# Patient Record
Sex: FEMALE | Race: WHITE | Employment: FULL TIME | URBAN - NONMETROPOLITAN AREA
[De-identification: names, ages, dates, MRNs, and addresses within clinical notes are randomized per-mention and may not be internally consistent; named-entity substitution may affect disease eponyms.]

---

## 2018-07-25 ENCOUNTER — HOSPITAL ENCOUNTER (EMERGENCY)
Age: 33
Discharge: HOME | End: 2018-07-25
Attending: EMERGENCY MEDICINE
Payer: COMMERCIAL

## 2018-07-25 VITALS
BODY MASS INDEX: 37.96 KG/M2 | SYSTOLIC BLOOD PRESSURE: 147 MMHG | HEART RATE: 88 BPM | DIASTOLIC BLOOD PRESSURE: 92 MMHG | HEIGHT: 59 IN | OXYGEN SATURATION: 100 % | WEIGHT: 188.27 LBS | RESPIRATION RATE: 18 BRPM | TEMPERATURE: 98.1 F

## 2018-07-25 DIAGNOSIS — F41.1 ANXIETY STATE: ICD-10-CM

## 2018-07-25 DIAGNOSIS — R07.89 CHEST WALL PAIN: Primary | ICD-10-CM

## 2018-07-25 LAB
EKG ATRIAL RATE: 95 BPM
EKG P AXIS: 59 DEGREES
EKG P-R INTERVAL: 138 MS
EKG Q-T INTERVAL: 360 MS
EKG QRS DURATION: 80 MS
EKG QTC CALCULATION (BAZETT): 452 MS
EKG R AXIS: 16 DEGREES
EKG T AXIS: 56 DEGREES
EKG VENTRICULAR RATE: 95 BPM

## 2018-07-25 PROCEDURE — 99284 EMERGENCY DEPT VISIT MOD MDM: CPT

## 2018-07-25 PROCEDURE — 93005 ELECTROCARDIOGRAM TRACING: CPT

## 2018-07-25 RX ORDER — BUSPIRONE HYDROCHLORIDE 10 MG/1
5 TABLET ORAL 2 TIMES DAILY
COMMUNITY

## 2018-07-25 RX ORDER — QUETIAPINE FUMARATE 25 MG/1
25 TABLET, FILM COATED ORAL NIGHTLY
COMMUNITY

## 2018-07-25 ASSESSMENT — PAIN DESCRIPTION - ORIENTATION: ORIENTATION: MID

## 2018-07-25 ASSESSMENT — PAIN DESCRIPTION - DESCRIPTORS: DESCRIPTORS: CONSTANT

## 2018-07-25 ASSESSMENT — PAIN DESCRIPTION - FREQUENCY: FREQUENCY: CONTINUOUS

## 2018-07-25 ASSESSMENT — PAIN DESCRIPTION - PAIN TYPE: TYPE: ACUTE PAIN

## 2018-07-25 ASSESSMENT — PAIN DESCRIPTION - PROGRESSION: CLINICAL_PROGRESSION: NOT CHANGED

## 2018-07-25 ASSESSMENT — PAIN DESCRIPTION - LOCATION: LOCATION: CHEST

## 2018-07-25 ASSESSMENT — PAIN SCALES - GENERAL: PAINLEVEL_OUTOF10: 6

## 2018-07-25 NOTE — ED PROVIDER NOTES
eMERGENCY dEPARTMENT eNCOUnter      CHIEF COMPLAINT    Chief Complaint   Patient presents with    Chest Pain     patient states she has some chest pain that started this am, has history of anxiety and states the walk in clinic sent her here       HPI    Anya Dodge is a 28 y.o. female who presentsto ED from Urgent care  By private car  With complaint of chest pain  Onset 2 days  Intensity of symptoms mild  She describes her chest pain as a knot in the left upper chest.  She denies any shortness of breath. 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 She was diagnosed with anxiety and was started on BuSpar one week ago. PAST  MEDICAL HISTORY    Past Medical History:   Diagnosis Date    Asthma     Depression        SURGICAL HISTORY    Past Surgical History:   Procedure Laterality Date    DILATION AND CURETTAGE OF UTERUS         CURRENT MEDICATIONS    Current Outpatient Rx   Medication Sig Dispense Refill    busPIRone (BUSPAR) 10 MG tablet Take 5 mg by mouth 2 times daily      QUEtiapine (SEROQUEL) 25 MG tablet Take 25 mg by mouth nightly         ALLERGIES    Allergies   Allergen Reactions    Codeine Nausea And Vomiting       FAMILY HISTORY    History reviewed. No pertinent family history.     SOCIAL HISTORY    Social History     Social History    Marital status:      Spouse name: N/A    Number of children: N/A    Years of education: N/A     Social History Main Topics    Smoking status: Never Smoker    Smokeless tobacco: Never Used    Alcohol use Yes      Comment: rare    Drug use: No    Sexual activity: Not Asked     Other Topics Concern    None     Social History Narrative    None       REVIEW OF SYSTEMS    Constitutional:  Denies fever, chills, weight loss or weakness   Eyes:  Denies photophobia or discharge   HENT:  Denies sore throat or ear pain   Respiratory:  Denies cough or shortness of breath   Cardiovascular:  C/O chest pain,but denies palpitations or swelling   GI: Denies abdominal pain, nausea, vomiting, or diarrhea   Musculoskeletal:  Denies back pain   Skin:  Denies rash   Neurologic:  Denies headache, focal weakness or sensory changes   Endocrine:  Denies polyuria or polydypsia   Lymphatic:  Denies swollen glands   Psychiatric:  Denies depression, suicidal ideation or homicidal ideation   All systems negative except as marked. PHYSICAL EXAM    VITAL SIGNS: BP (!) 147/92   Pulse 88   Temp 98.1 °F (36.7 °C) (Oral)   Resp 18   Ht 4' 11\" (1.499 m)   Wt 188 lb 4.4 oz (85.4 kg)   LMP 07/04/2018   SpO2 100%   BMI 38.03 kg/m²    Constitutional:  Well developed, Well nourished, No acute distress, Non-toxic appearance. HENT:  Normocephalic, Atraumatic, Bilateral external ears normal, Oropharynx moist, No oral exudates, Nose normal. Neck- Normal range of motion, No tenderness, Supple, No stridor. Eyes:  PERRL, EOMI, Conjunctiva normal, No discharge. Respiratory:  Normal breath sounds, No respiratory distress, No wheezing,chest wall  Tenderness  present which reproduces her symptoms. Cardiovascular:  Normal heart rate, Normal rhythm, No murmurs, No rubs, No gallops. GI:  Bowel sounds normal, Soft, No tenderness, No masses, No pulsatile masses. :  No CVA tenderness. Musculoskeletal:  Intact distal pulses, No edema, No tenderness, No cyanosis, No clubbing. Good range of motion in all major joints. No tenderness to palpation or major deformities noted. Back- No tenderness. Integument:  Warm, Dry, No erythema, No rash. Lymphatic:  No lymphadenopathy noted. Neurologic:  Alert & oriented x 3, Normal motor function, Normal sensory function, No focal deficits noted.    Psychiatric:  Affect normal, Judgment normal, Mood normal.     EKG    NSR HR 95 No t T wave changes , QTc 452    RADIOLOGY    No orders to display       [address] [place] [address] Reviewed - No data to display          Summation      Patient Course:  patient here for

## 2018-07-25 NOTE — ED NOTES
Dr. Kaylyn Dandy at bedside, patient denies need for chest pain workup, states it is anxiety and wants to go home.       Eliz Stanley RN  07/25/18 [id number]

## 2018-07-25 NOTE — LETTER
[facility] ED  [address] [address]  Phone: [phone]               July 25, 2018    Patient: Kavitha Johnson   YOB: 1985   Date of Visit: 7/25/2018       To Whom It May Concern:    Lisa Quijano was seen and treated in our emergency department on 7/25/2018. She may return to work on 7/30/18. .      Sincerely,       Gerardo Clinton RN         Signature:__________________________________

## 2018-07-25 NOTE — ED NOTES
Patient laying in bed, awake and alert, family at bedside. Dr. Bessy Brown updated on patient.       Davon Sow RN  07/25/18 [id number]